# Patient Record
Sex: FEMALE | Race: WHITE | NOT HISPANIC OR LATINO
[De-identification: names, ages, dates, MRNs, and addresses within clinical notes are randomized per-mention and may not be internally consistent; named-entity substitution may affect disease eponyms.]

---

## 2017-08-12 ENCOUNTER — HEALTH MAINTENANCE LETTER (OUTPATIENT)
Age: 47
End: 2017-08-12

## 2018-08-19 ENCOUNTER — HEALTH MAINTENANCE LETTER (OUTPATIENT)
Age: 48
End: 2018-08-19

## 2019-11-06 ENCOUNTER — HEALTH MAINTENANCE LETTER (OUTPATIENT)
Age: 49
End: 2019-11-06

## 2020-11-29 ENCOUNTER — HEALTH MAINTENANCE LETTER (OUTPATIENT)
Age: 50
End: 2020-11-29

## 2021-04-09 ENCOUNTER — OFFICE VISIT (OUTPATIENT)
Dept: DERMATOLOGY | Facility: CLINIC | Age: 51
End: 2021-04-09
Payer: COMMERCIAL

## 2021-04-09 DIAGNOSIS — L82.1 SEBORRHEIC KERATOSIS: Primary | ICD-10-CM

## 2021-04-09 DIAGNOSIS — L21.9 SEBORRHEIC DERMATITIS: ICD-10-CM

## 2021-04-09 PROCEDURE — 99203 OFFICE O/P NEW LOW 30 MIN: CPT | Performed by: DERMATOLOGY

## 2021-04-09 RX ORDER — LOSARTAN POTASSIUM 25 MG/1
25 TABLET ORAL
COMMUNITY
Start: 2020-12-07

## 2021-04-09 RX ORDER — KETOCONAZOLE 20 MG/ML
SHAMPOO TOPICAL
Qty: 120 ML | Refills: 11 | Status: SHIPPED | OUTPATIENT
Start: 2021-04-09

## 2021-04-09 ASSESSMENT — PAIN SCALES - GENERAL: PAINLEVEL: NO PAIN (0)

## 2021-04-09 NOTE — NURSING NOTE
Dermatology Rooming Note    Yaquelin Thomas's goals for this visit include:   Chief Complaint   Patient presents with     Skin Check     yaquelin is coming in today for a skin check, states that she has a spot on her hip that showed up about 1 month ago     Constanza Ellison CMA on 4/9/2021 at 2:41 PM

## 2021-04-09 NOTE — PATIENT INSTRUCTIONS
Skin looks great today!  Benign thickening = Seborrheic keratosis   Keep up the good work :)    Return 1-2 years, sooner if concerns.      Patient Education     Checking for Skin Cancer  You can find cancer early by checking your skin each month. There are 3 kinds of skin cancer. They are melanoma, basal cell carcinoma, and squamous cell carcinoma. Doing monthly skin checks is the best way to find new marks or skin changes. Follow the instructions below for checking your skin.   The ABCDEs of checking moles for melanoma   Check your moles or growths for signs of melanoma using ABCDE:     Asymmetry: the sides of the mole or growth don t match    Border: the edges are ragged, notched, or blurred    Color: the color within the mole or growth varies    Diameter: the mole or growth is larger than 6 mm (size of a pencil eraser)    Evolving: the size, shape, or color of the mole or growth is changing (evolving is not shown in the images below)    Checking for other types of skin cancer  Basal cell carcinoma or squamous cell carcinoma have symptoms such as:       A spot or mole that looks different from all other marks on your skin    Changes in how an area feels, such as itching, tenderness, or pain    Changes in the skin's surface, such as oozing, bleeding, or scaliness    A sore that does not heal    New swelling or redness beyond the border of a mole    Who s at risk?  Anyone can get skin cancer. But you are at greater risk if you have:     Fair skin, light-colored hair, or light-colored eyes    Many moles or abnormal moles on your skin    A history of sunburns from sunlight or tanning beds    A family history of skin cancer    A history of exposure to radiation or chemicals    A weakened immune system  If you have had skin cancer in the past, you are at risk for recurring skin cancer.   How to check your skin  Do your monthly skin checkups in front of a full-length mirror. Check all parts of your body, including your:      Head (ears, face, neck, and scalp)    Torso (front, back, and sides)    Arms (tops, undersides, upper, and lower armpits)    Hands (palms, backs, and fingers, including under the nails)    Buttocks and genitals    Legs (front, back, and sides)    Feet (tops, soles, toes, including under the nails, and between toes)  If you have a lot of moles, take digital photos of them each month. Make sure to take photos both up close and from a distance. These can help you see if any moles change over time.   Most skin changes are not cancer. But if you see any changes in your skin, call your doctor right away. Only he or she can diagnose a problem. If you have skin cancer, seeing your doctor can be the first step toward getting the treatment that could save your life.   [employer] last reviewed this educational content on 4/1/2019 2000-2020 [employer]. [address] [place] [address]. All rights reserved. This information is not intended as a substitute for professional medical care. Always follow your healthcare professional's instructions.       When should I call my doctor?    If you are worsening or not improving, please, contact us or seek urgent care as noted below.     Who should I call with questions (adults)?    [facility] (adult and pediatric): [phone]     [facility] (adult): [phone]    For urgent needs outside of business hours call the [facility] at [phone] and ask for the dermatology resident on call    If this is a medical emergency and you are unable to reach an ER, Call [phone]      Who should I call with questions (pediatric)?  [facility]- Pediatric Dermatology  Dr. Michelle Anderson, Dr. Yajaira Joseph, Dr. Dafne Mckeon, Lynnette Seay, PA  Dr. Fanny Basurto, Dr. Lupe Manriquez & Dr. Abel Ma  Non Urgent  Nurse Triage Line; [phone]- Odette and Mayra BETANCOURT Care Coordinators    Jaci (/Complex ) [phone]    If you need a prescription refill, please contact your pharmacy. Refills are approved or denied by our Physicians during normal business hours, Monday through Fridays  Per office policy, refills will not be granted if you have not been seen within the past year (or sooner depending on your child's condition)    Scheduling Information:  Pediatric Appointment Scheduling and Call Center [phone]  Radiology Scheduling- [phone]  Sedation Unit Scheduling- [phone]  [place] Scheduling- [facility] [phone]; Pediatric Dermatology [phone]  Main  Services: [phone]  Cook Islander: [phone]  Mozambican: [phone]  Hmong/[place]str[place]/Argentine: [phone]  Preadmission Nursing Department Fax Number: [phone] (Fax all pre-operative paperwork to this number)    For urgent matters arising during evenings, weekends, or holidays that cannot wait for normal business hours please call [phone] and ask for the Dermatology Resident On-Call to be paged.

## 2021-04-09 NOTE — LETTER
4/9/2021       RE: Yaquelin Thomas  [address]  [facility] [address]     Dear Colleague,    Thank you for referring your patient, Yaquelin Thomas, to the [facility] [place] [facility]. Please see a copy of my visit note below.    We went on the American Contact D[employer] web site and printed out a list of products free of these allergens (search codes 1: [id number]  search codes 2: [id number]    Verbally allowe me to look at [facility] records    [facility] Dermatology Note  Encounter Date: Apr 9, 2021  Office Visit     Dermatology Problem List:  1. Allergic contact dermatitis.  - S/p patch testing 2017 at [facility]  - mild (1+) reactions to methyldibromo gluteronitrile, gold, carmine, and linalool.   - search codes 1: [id number] and search codes 2: [id number]  2. Seborrheic dermatitis.  - Ketoconazole shampoo    ____________________________________________    Assessment & Plan:     # Seborrheic keratoses. Discussed the natural history and benign nature of this lesion. Reassurance provided that no additional treatment is necessary.     # Seborrheic dermatitis.   - Start ketoconazole shampoo 3 times per week. Counseled to massage into wet scalp, let sit 5 min, then rinse.    # Allergic contact dermatitis.  - [facility] patch testing results reviewed and noted  - Ketoconazole shampoo is on her CAMP list    Procedures Performed:   None    Follow-up: 1-2 years, sooner if concerns.     Staff:     Willow Musa MD    Department of Dermatology  [facility]: Phone: [phone], Fax: [phone]  4/15/2021    ____________________________________________    CC: Skin Check (yaquelin is coming in today for a skin check, states that she has a spot on her hip that showed up about 1 month ago)    HPI:  Ms. Yaquelin CELESTE William is a(n) 51  year old female who presents today as a new patient for spot on hip.  Showed up about 5 weeks ago. It is a little reddish and dry/crusty.  No pain or bleeding, not growing.  No personal hx of skin cancer.  She is adopted.  No other painful, bleeding, non-healing, or otherwise symptomatic lesions.     Patient is otherwise feeling well, without additional skin concerns.     Labs Reviewed:  N/A    Physical Exam:  Vitals: There were no vitals taken for this visit.  SKIN: Total skin excluding the undergarment areas was performed. The exam included the head/face, neck, both arms, chest, back, abdomen, both legs, digits and/or nails.   - lesion of concern is waxy stuck on papule on left hip.  - mild erythema and scale on scalp.  - Multiple regular brown pigmented macules and papules are identified on the trunk and extremities.   - No other lesions of concern on areas examined.     Medications:  Current Outpatient Medications   Medication     ketoconazole (NIZORAL) 2 % external shampoo     levothyroxine (SSYNTHROID,LEVOTHROID) 100 MCG tablet     losartan (COZAAR) 25 MG tablet     No current facility-administered medications for this visit.       Past Medical History:   Patient Active Problem List   Diagnosis     PAP SMEAR OF CERVIX W HSIL     Syncope and collapse     MTHFR mutation (methylenetetrahydrofolate reductase) (H)     Hypothyroidism     CARDIOVASCULAR SCREENING; LDL GOAL LESS THAN 160     Plantar fascial fibromatosis     Past Medical History:   Diagnosis Date     MTHFR mutation (methylenetetrahydrofolate reductase) (H)      syncope     2 episodes, last one in 98 but had full workup, could be vaso vagal when stands too long.       CC Referred Self, MD  No address on file on close of this encounter.

## 2021-04-09 NOTE — PROGRESS NOTES
We went on the American Contact [employer] web site and printed out a list of products free of these allergens (search codes 1: [id number]  search codes 2: [id number]    Verbally allowe me to look at kim butler    [facility] Dermatology Note  Encounter Date: Apr 9, 2021  Office Visit     Dermatology Problem List:  1. Allergic contact dermatitis.  - S/p patch testing 2017 at [facility]  - mild (1+) reactions to methyldibromo gluteronitrile, gold, carmine, and linalool.   - search codes 1: [id number] and search codes 2: [id number]  2. Seborrheic dermatitis.  - Ketoconazole shampoo    ____________________________________________    Assessment & Plan:     # Seborrheic keratoses. Discussed the natural history and benign nature of this lesion. Reassurance provided that no additional treatment is necessary.     # Seborrheic dermatitis.   - Start ketoconazole shampoo 3 times per week. Counseled to massage into wet scalp, let sit 5 min, then rinse.    # Allergic contact dermatitis.  - [facility] patch testing results reviewed and noted  - Ketoconazole shampoo is on her CAMP list    Procedures Performed:   None    Follow-up: 1-2 years, sooner if concerns.     Staff:     Willow Musa MD    Department of Dermatology  [facility]: Phone: [phone], Fax: [phone]  4/15/2021    ____________________________________________    CC: Skin Check (yaquelin is coming in today for a skin check, states that she has a spot on her hip that showed up about 1 month ago)    HPI:  Ms. Yaquelin Thomas is a(n) 51 year old female who presents today as a new patient for spot on hip.  Showed up about 5 weeks ago. It is a little reddish and dry/crusty.  No pain or bleeding, not growing.  No personal hx of skin cancer.  She is adopted.  No other painful, bleeding, non-healing, or otherwise symptomatic lesions.     Patient is otherwise  feeling well, without additional skin concerns.     Labs Reviewed:  N/A    Physical Exam:  Vitals: There were no vitals taken for this visit.  SKIN: Total skin excluding the undergarment areas was performed. The exam included the head/face, neck, both arms, chest, back, abdomen, both legs, digits and/or nails.   - lesion of concern is waxy stuck on papule on left hip.  - mild erythema and scale on scalp.  - Multiple regular brown pigmented macules and papules are identified on the trunk and extremities.   - No other lesions of concern on areas examined.     Medications:  Current Outpatient Medications   Medication     ketoconazole (NIZORAL) 2 % external shampoo     levothyroxine (SSYNTHROID,LEVOTHROID) 100 MCG tablet     losartan (COZAAR) 25 MG tablet     No current facility-administered medications for this visit.       Past Medical History:   Patient Active Problem List   Diagnosis     PAP SMEAR OF CERVIX W HSIL     Syncope and collapse     MTHFR mutation (methylenetetrahydrofolate reductase) (H)     Hypothyroidism     CARDIOVASCULAR SCREENING; LDL GOAL LESS THAN 160     Plantar fascial fibromatosis     Past Medical History:   Diagnosis Date     MTHFR mutation (methylenetetrahydrofolate reductase) (H)      syncope     2 episodes, last one in 98 but had full workup, could be vaso vagal when stands too long.       CC Referred Self, MD  No address on file on close of this encounter.

## 2021-09-19 ENCOUNTER — HEALTH MAINTENANCE LETTER (OUTPATIENT)
Age: 51
End: 2021-09-19

## 2022-01-09 ENCOUNTER — HEALTH MAINTENANCE LETTER (OUTPATIENT)
Age: 52
End: 2022-01-09

## 2022-10-30 ENCOUNTER — TELEPHONE (OUTPATIENT)
Dept: DERMATOLOGY | Facility: CLINIC | Age: 52
End: 2022-10-30

## 2022-10-30 NOTE — TELEPHONE ENCOUNTER
Made the second attempt today to call pt to reschedule her appointment with  on 2/7/23 at 3:00pm. Was unable to reach pt. Pt's mailbox is not set up yet. Sent the [facility] reschedule letter to be mail.

## 2022-11-21 ENCOUNTER — HEALTH MAINTENANCE LETTER (OUTPATIENT)
Age: 52
End: 2022-11-21

## 2023-06-02 ENCOUNTER — HEALTH MAINTENANCE LETTER (OUTPATIENT)
Age: 53
End: 2023-06-02

## 2023-08-28 ENCOUNTER — ANCILLARY ORDERS (OUTPATIENT)
Dept: CT IMAGING | Facility: CLINIC | Age: 53
End: 2023-08-28

## 2023-08-28 DIAGNOSIS — R03.0 ELEVATED BLOOD PRESSURE READING WITHOUT DIAGNOSIS OF HYPERTENSION: Primary | ICD-10-CM

## 2024-06-23 ENCOUNTER — HEALTH MAINTENANCE LETTER (OUTPATIENT)
Age: 54
End: 2024-06-23